# Patient Record
Sex: MALE | Race: WHITE | NOT HISPANIC OR LATINO | Employment: OTHER | URBAN - METROPOLITAN AREA
[De-identification: names, ages, dates, MRNs, and addresses within clinical notes are randomized per-mention and may not be internally consistent; named-entity substitution may affect disease eponyms.]

---

## 2023-05-25 ENCOUNTER — TELEPHONE (OUTPATIENT)
Dept: PRIMARY CARE | Facility: CLINIC | Age: 68
End: 2023-05-25
Payer: MEDICARE

## 2023-05-25 NOTE — TELEPHONE ENCOUNTER
Son called and is questioning how much clopidigril dad should be taking, 1 or 2 tabs.  Hospital discontinued prolithium (? On spelling) which dad was taking bid.  ([phone])

## 2023-05-25 NOTE — TELEPHONE ENCOUNTER
Son called and is questioning how much clopidigril dad should be taking, 1 or 2 tabs.  Hospital discontinued prolithium (? On spelling) which dad was taking bid.  ([phone])

## 2023-11-06 DIAGNOSIS — I25.10 CORONARY ARTERY DISEASE INVOLVING NATIVE CORONARY ARTERY OF NATIVE HEART WITHOUT ANGINA PECTORIS: Primary | ICD-10-CM

## 2023-11-06 RX ORDER — ATORVASTATIN CALCIUM 80 MG/1
80 TABLET, FILM COATED ORAL NIGHTLY
COMMUNITY
Start: 2023-08-18 | End: 2023-11-17 | Stop reason: SDUPTHER

## 2023-11-06 RX ORDER — CLOPIDOGREL BISULFATE 75 MG/1
75 TABLET ORAL DAILY
COMMUNITY
Start: 2023-08-09 | End: 2023-11-06 | Stop reason: SDUPTHER

## 2023-11-06 RX ORDER — CLOPIDOGREL BISULFATE 75 MG/1
75 TABLET ORAL DAILY
Qty: 90 TABLET | Refills: 0 | Status: SHIPPED | OUTPATIENT
Start: 2023-11-06 | End: 2024-02-05 | Stop reason: SDUPTHER

## 2023-11-06 RX ORDER — METOPROLOL SUCCINATE 200 MG/1
200 TABLET, EXTENDED RELEASE ORAL DAILY
COMMUNITY
Start: 2023-09-22

## 2023-11-06 RX ORDER — LOSARTAN POTASSIUM 25 MG/1
25 TABLET ORAL DAILY
COMMUNITY
Start: 2023-09-11 | End: 2024-06-03

## 2023-11-17 DIAGNOSIS — I25.10 CORONARY ARTERY DISEASE, UNSPECIFIED VESSEL OR LESION TYPE, UNSPECIFIED WHETHER ANGINA PRESENT, UNSPECIFIED WHETHER NATIVE OR TRANSPLANTED HEART: ICD-10-CM

## 2023-11-17 RX ORDER — ATORVASTATIN CALCIUM 80 MG/1
80 TABLET, FILM COATED ORAL NIGHTLY
Qty: 90 TABLET | Refills: 3 | Status: SHIPPED | OUTPATIENT
Start: 2023-11-17 | End: 2024-02-13 | Stop reason: SDUPTHER

## 2024-02-05 DIAGNOSIS — I25.10 CORONARY ARTERY DISEASE INVOLVING NATIVE CORONARY ARTERY OF NATIVE HEART WITHOUT ANGINA PECTORIS: ICD-10-CM

## 2024-02-05 RX ORDER — CLOPIDOGREL BISULFATE 75 MG/1
75 TABLET ORAL DAILY
Qty: 90 TABLET | Refills: 0 | Status: SHIPPED | OUTPATIENT
Start: 2024-02-05

## 2024-02-13 DIAGNOSIS — I25.10 CORONARY ARTERY DISEASE, UNSPECIFIED VESSEL OR LESION TYPE, UNSPECIFIED WHETHER ANGINA PRESENT, UNSPECIFIED WHETHER NATIVE OR TRANSPLANTED HEART: ICD-10-CM

## 2024-02-13 RX ORDER — ATORVASTATIN CALCIUM 80 MG/1
80 TABLET, FILM COATED ORAL NIGHTLY
Qty: 90 TABLET | Refills: 3 | Status: SHIPPED | OUTPATIENT
Start: 2024-02-13

## 2024-03-22 ENCOUNTER — APPOINTMENT (OUTPATIENT)
Dept: CARDIOLOGY | Facility: CLINIC | Age: 69
End: 2024-03-22
Payer: COMMERCIAL

## 2024-05-29 PROBLEM — M19.90 OSTEOARTHRITIS: Status: ACTIVE | Noted: 2024-05-29

## 2024-05-29 PROBLEM — I25.2 OLD MI (MYOCARDIAL INFARCTION): Status: ACTIVE | Noted: 2024-05-29

## 2024-05-29 PROBLEM — I25.10 ATHEROSCLEROTIC HEART DISEASE: Status: ACTIVE | Noted: 2024-05-29

## 2024-05-29 PROBLEM — I10 ESSENTIAL HYPERTENSION: Status: ACTIVE | Noted: 2024-05-29

## 2024-05-29 PROBLEM — M25.512 LEFT SHOULDER PAIN: Status: ACTIVE | Noted: 2024-05-29

## 2024-05-29 PROBLEM — R07.9 CHEST PAIN: Status: ACTIVE | Noted: 2024-05-29

## 2024-05-29 PROBLEM — E78.5 HYPERLIPIDEMIA: Status: ACTIVE | Noted: 2024-05-29

## 2024-06-03 DIAGNOSIS — I10 ESSENTIAL HYPERTENSION: Primary | ICD-10-CM

## 2024-06-03 RX ORDER — LOSARTAN POTASSIUM 25 MG/1
25 TABLET ORAL DAILY
Qty: 90 TABLET | Refills: 3 | Status: SHIPPED | OUTPATIENT
Start: 2024-06-03

## 2024-06-21 ENCOUNTER — APPOINTMENT (OUTPATIENT)
Dept: CARDIOLOGY | Facility: CLINIC | Age: 69
End: 2024-06-21
Payer: COMMERCIAL

## 2024-06-21 VITALS
SYSTOLIC BLOOD PRESSURE: 134 MMHG | HEIGHT: 70 IN | OXYGEN SATURATION: 97 % | WEIGHT: 145.8 LBS | DIASTOLIC BLOOD PRESSURE: 80 MMHG | HEART RATE: 78 BPM | BODY MASS INDEX: 20.87 KG/M2

## 2024-06-21 DIAGNOSIS — I25.2 OLD MI (MYOCARDIAL INFARCTION): ICD-10-CM

## 2024-06-21 DIAGNOSIS — E78.49 OTHER HYPERLIPIDEMIA: ICD-10-CM

## 2024-06-21 DIAGNOSIS — I25.10 ATHEROSCLEROSIS OF NATIVE CORONARY ARTERY OF NATIVE HEART WITHOUT ANGINA PECTORIS: ICD-10-CM

## 2024-06-21 DIAGNOSIS — I10 ESSENTIAL HYPERTENSION: ICD-10-CM

## 2024-06-21 PROCEDURE — 1159F MED LIST DOCD IN RCRD: CPT | Performed by: INTERNAL MEDICINE

## 2024-06-21 PROCEDURE — 99214 OFFICE O/P EST MOD 30 MIN: CPT | Performed by: INTERNAL MEDICINE

## 2024-06-21 PROCEDURE — 93000 ELECTROCARDIOGRAM COMPLETE: CPT | Performed by: INTERNAL MEDICINE

## 2024-06-21 PROCEDURE — 3075F SYST BP GE 130 - 139MM HG: CPT | Performed by: INTERNAL MEDICINE

## 2024-06-21 PROCEDURE — 3079F DIAST BP 80-89 MM HG: CPT | Performed by: INTERNAL MEDICINE

## 2024-06-21 RX ORDER — ALPRAZOLAM 0.5 MG/1
0.5 TABLET ORAL NIGHTLY PRN
COMMUNITY
Start: 2024-06-11 | End: 2024-07-11

## 2024-06-21 NOTE — PROGRESS NOTES
[facility] Cardiology Outpatient Follow-up Visit     Reason for Visit: CAD    HPI: Jorge Marshall is a 69 y.o.  male who presents today for followup.     Patient is a 69-year-old male with a history of hypertension, borderline hyperlipidemia, family history of CAD and a previous history of spontaneous carotid dissection who initially presented with chest discomfort. He was found to have NSTEMI. He underwent cardiac catheterization and stent implantation. He presents today for follow-up. He denies any chest discomfort per se. He does admit to palpitations and extra heartbeats. He presented in August 2023 for these issues and felt he was having another heart attack.  He had 1 episode in April and May 2024.  These lasted 5 to 10 seconds and resolved spontaneously.  He denies any shortness of breath, lightheadedness, syncope, orthopnea, PND, lower extremity edema.      6/16/2023 ECG demonstrates sinus rhythm, heart rate 100. May 2023 . 5/22/2023 echo: Ejection fraction 60 to 65%, asymmetric left ventricular hypertrophy, mild aortic regurgitation. Cardiac catheterization 5/22/2023: Severe distal to apical LAD disease suitable for medical therapy, severe proximal circumflex artery stenosis status post [place] UMER, mild RCA disease, normal left heart filling pressures. 8/25/2023 MPI: Normal perfusion, ejection fraction 56%.  8/25/2023 , LDL 40, HDL 50, triglycerides 55.  6/21/2024 ECG: Normal sinus rhythm, poor R wave progression.    Past Medical History:   He has no past medical history on file.    Surgical History:   He has no past surgical history on file.    Family History:   No family history on file.    Allergies:  Penicillins     Social History:   Remote tobacco, no alcohol or drugs.    Prior Cardiovascular Testing (Personally Reviewed):     Review of Systems:  Review of Systems   All other systems reviewed and are negative.      Outpatient Medications:    Current Outpatient Medications:      atorvastatin (Lipitor) 80 mg tablet, Take 1 tablet (80 mg) by mouth once daily at bedtime., Disp: 90 tablet, Rfl: 3    clopidogrel (Plavix) 75 mg tablet, Take 1 tablet (75 mg) by mouth once daily., Disp: 90 tablet, Rfl: 0    losartan (Cozaar) 25 mg tablet, TAKE 1 TABLET BY MOUTH EVERY DAY, Disp: 90 tablet, Rfl: 3    metoprolol succinate XL (Toprol-XL) 200 mg 24 hr tablet, Take 1 tablet (200 mg) by mouth once daily., Disp: , Rfl:      Last Recorded Vitals  There were no vitals taken for this visit.    Physical Exam:    Physical Exam  Vitals reviewed.   Constitutional:       Appearance: Normal appearance.   HENT:      Head: Normocephalic and atraumatic.      Mouth/Throat:      Mouth: Mucous membranes are moist.      Pharynx: Oropharynx is clear.   Eyes:      Extraocular Movements: Extraocular movements intact.      Conjunctiva/sclera: Conjunctivae normal.   Cardiovascular:      Rate and Rhythm: Normal rate and regular rhythm.      Pulses: Normal pulses.      Heart sounds: Normal heart sounds.   Pulmonary:      Effort: Pulmonary effort is normal.      Breath sounds: Normal breath sounds.   Abdominal:      General: Bowel sounds are normal.      Palpations: Abdomen is soft.   Musculoskeletal:         General: No swelling.      Cervical back: Neck supple.   Skin:     General: Skin is warm and dry.   Neurological:      General: No focal deficit present.      Mental Status: He is alert.   Psychiatric:         Mood and Affect: Mood normal.         Behavior: Behavior normal.         Lab/Radiology/Diagnostic Review:    Labs    Lab Results   Component Value Date    GLUCOSE 98 08/24/2023    CALCIUM 9.8 08/24/2023     08/24/2023    K 5.0 08/24/2023    CO2 29 08/24/2023     08/24/2023    BUN 15 08/24/2023    CREATININE 1.06 08/24/2023       Lab Results   Component Value Date    WBC 6.7 08/24/2023    HGB 15.5 08/24/2023    HCT 47.8 08/24/2023    MCV 91 08/24/2023     08/24/2023       Lab Results   Component Value  "Date    CHOL 101 08/25/2023    CHOL CANCELED 05/20/2023    CHOL 204 (H) 05/20/2023     Lab Results   Component Value Date    HDL 49.9 08/25/2023    HDL CANCELED 05/20/2023    HDL 48.5 05/20/2023     No results found for: \"LDLCALC\"  Lab Results   Component Value Date    TRIG 55 08/25/2023    TRIG CANCELED 05/20/2023    TRIG 138 05/20/2023     No components found for: \"CHOLHDL\"    Lab Results   Component Value Date    BNP 7 08/24/2023       Lab Results   Component Value Date    TSH 1.09 08/24/2023       Assessment:   Patient is doing reasonably well from a cardiac standpoint.     Patient will stay on high intensity statin and clopidogrel therapy.    We will check a lipid panel prior to his next visit.     Patient does admit to a rare palpitation and was diagnosed with PVCs. Continue metoprolol succinate 200 mg daily. He will stay on losartan 25 mg daily. Some of this may be related to an anxious demeanor.     He will call us if he has other issues.     Patient will follow-up with us in 12 months or sooner if he has more problems.     Irving Sadler MD      "

## 2024-06-21 NOTE — LETTER
June 21, 2024       No Recipients    Patient: Jorge Marshall   YOB: 1955   Date of Visit: 6/21/2024       Dear Dr. Tijerina Recipients:    Thank you for referring Jorge Marshall to me for evaluation. Below are my notes for this consultation.  If you have questions, please do not hesitate to call me. I look forward to following your patient along with you.       Sincerely,     Irving Sadler MD      CC:   No Recipients  ______________________________________________________________________________________        [facility] Cardiology Outpatient Follow-up Visit     Reason for Visit: CAD    HPI: Jorge Marshall is a 69 y.o.  male who presents today for followup.     Patient is a 69-year-old male with a history of hypertension, borderline hyperlipidemia, family history of CAD and a previous history of spontaneous carotid dissection who initially presented with chest discomfort. He was found to have NSTEMI. He underwent cardiac catheterization and stent implantation. He presents today for follow-up. He denies any chest discomfort per se. He does admit to palpitations and extra heartbeats. He presented in August 2023 for these issues and felt he was having another heart attack.  He had 1 episode in April and May 2024.  These lasted 5 to 10 seconds and resolved spontaneously.  He denies any shortness of breath, lightheadedness, syncope, orthopnea, PND, lower extremity edema.      6/16/2023 ECG demonstrates sinus rhythm, heart rate 100. May 2023 . 5/22/2023 echo: Ejection fraction 60 to 65%, asymmetric left ventricular hypertrophy, mild aortic regurgitation. Cardiac catheterization 5/22/2023: Severe distal to apical LAD disease suitable for medical therapy, severe proximal circumflex artery stenosis status post [place] UMER, mild RCA disease, normal left heart filling pressures. 8/25/2023 MPI: Normal perfusion, ejection fraction 56%.  8/25/2023 , LDL 40, HDL 50, triglycerides 55.   6/21/2024 ECG: Normal sinus rhythm, poor R wave progression.    Past Medical History:   He has no past medical history on file.    Surgical History:   He has no past surgical history on file.    Family History:   No family history on file.    Allergies:  Penicillins     Social History:   Remote tobacco, no alcohol or drugs.    Prior Cardiovascular Testing (Personally Reviewed):     Review of Systems:  Review of Systems   All other systems reviewed and are negative.      Outpatient Medications:    Current Outpatient Medications:   •  atorvastatin (Lipitor) 80 mg tablet, Take 1 tablet (80 mg) by mouth once daily at bedtime., Disp: 90 tablet, Rfl: 3  •  clopidogrel (Plavix) 75 mg tablet, Take 1 tablet (75 mg) by mouth once daily., Disp: 90 tablet, Rfl: 0  •  losartan (Cozaar) 25 mg tablet, TAKE 1 TABLET BY MOUTH EVERY DAY, Disp: 90 tablet, Rfl: 3  •  metoprolol succinate XL (Toprol-XL) 200 mg 24 hr tablet, Take 1 tablet (200 mg) by mouth once daily., Disp: , Rfl:      Last Recorded Vitals  There were no vitals taken for this visit.    Physical Exam:    Physical Exam  Vitals reviewed.   Constitutional:       Appearance: Normal appearance.   HENT:      Head: Normocephalic and atraumatic.      Mouth/Throat:      Mouth: Mucous membranes are moist.      Pharynx: Oropharynx is clear.   Eyes:      Extraocular Movements: Extraocular movements intact.      Conjunctiva/sclera: Conjunctivae normal.   Cardiovascular:      Rate and Rhythm: Normal rate and regular rhythm.      Pulses: Normal pulses.      Heart sounds: Normal heart sounds.   Pulmonary:      Effort: Pulmonary effort is normal.      Breath sounds: Normal breath sounds.   Abdominal:      General: Bowel sounds are normal.      Palpations: Abdomen is soft.   Musculoskeletal:         General: No swelling.      Cervical back: Neck supple.   Skin:     General: Skin is warm and dry.   Neurological:      General: No focal deficit present.      Mental Status: He is alert.  "  Psychiatric:         Mood and Affect: Mood normal.         Behavior: Behavior normal.         Lab/Radiology/Diagnostic Review:    Labs    Lab Results   Component Value Date    GLUCOSE 98 08/24/2023    CALCIUM 9.8 08/24/2023     08/24/2023    K 5.0 08/24/2023    CO2 29 08/24/2023     08/24/2023    BUN 15 08/24/2023    CREATININE 1.06 08/24/2023       Lab Results   Component Value Date    WBC 6.7 08/24/2023    HGB 15.5 08/24/2023    HCT 47.8 08/24/2023    MCV 91 08/24/2023     08/24/2023       Lab Results   Component Value Date    CHOL 101 08/25/2023    CHOL CANCELED 05/20/2023    CHOL 204 (H) 05/20/2023     Lab Results   Component Value Date    HDL 49.9 08/25/2023    HDL CANCELED 05/20/2023    HDL 48.5 05/20/2023     No results found for: \"LDLCALC\"  Lab Results   Component Value Date    TRIG 55 08/25/2023    TRIG CANCELED 05/20/2023    TRIG 138 05/20/2023     No components found for: \"CHOLHDL\"    Lab Results   Component Value Date    BNP 7 08/24/2023       Lab Results   Component Value Date    TSH 1.09 08/24/2023       Assessment:   Patient is doing reasonably well from a cardiac standpoint.     Patient will stay on high intensity statin and clopidogrel therapy.    We will check a lipid panel prior to his next visit.     Patient does admit to a rare palpitation and was diagnosed with PVCs. Continue metoprolol succinate 200 mg daily. He will stay on losartan 25 mg daily. Some of this may be related to an anxious demeanor.     He will call us if he has other issues.     Patient will follow-up with us in 12 months or sooner if he has more problems.     Irving Sadler MD    "

## 2024-06-21 NOTE — LETTER
June 21, 2024     Angel Cottrell MD  [address] [facility] [address]    Patient: Jorge Marshall   YOB: 1955   Date of Visit: 6/21/2024       Dear Dr. Angel Cottrell MD:    Thank you for referring Jorge Marshall to me for evaluation. Below are my notes for this consultation.  If you have questions, please do not hesitate to call me. I look forward to following your patient along with you.       Sincerely,     Irving Sadler MD      CC: No Recipients  ______________________________________________________________________________________        [facility] Cardiology Outpatient Follow-up Visit     Reason for Visit: CAD    HPI: Jorge Marshall is a 69 y.o.  male who presents today for followup.     Patient is a 69-year-old male with a history of hypertension, borderline hyperlipidemia, family history of CAD and a previous history of spontaneous carotid dissection who initially presented with chest discomfort. He was found to have NSTEMI. He underwent cardiac catheterization and stent implantation. He presents today for follow-up. He denies any chest discomfort per se. He does admit to palpitations and extra heartbeats. He presented in August 2023 for these issues and felt he was having another heart attack.  He had 1 episode in April and May 2024.  These lasted 5 to 10 seconds and resolved spontaneously.  He denies any shortness of breath, lightheadedness, syncope, orthopnea, PND, lower extremity edema.      6/16/2023 ECG demonstrates sinus rhythm, heart rate 100. May 2023 . 5/22/2023 echo: Ejection fraction 60 to 65%, asymmetric left ventricular hypertrophy, mild aortic regurgitation. Cardiac catheterization 5/22/2023: Severe distal to apical LAD disease suitable for medical therapy, severe proximal circumflex artery stenosis status post [place] UMER, mild RCA disease, normal left heart filling pressures. 8/25/2023 MPI: Normal perfusion, ejection fraction 56%.  8/25/2023 TC  101, LDL 40, HDL 50, triglycerides 55.  6/21/2024 ECG: Normal sinus rhythm, poor R wave progression.    Past Medical History:   He has no past medical history on file.    Surgical History:   He has no past surgical history on file.    Family History:   No family history on file.    Allergies:  Penicillins     Social History:   Remote tobacco, no alcohol or drugs.    Prior Cardiovascular Testing (Personally Reviewed):     Review of Systems:  Review of Systems   All other systems reviewed and are negative.      Outpatient Medications:    Current Outpatient Medications:   •  atorvastatin (Lipitor) 80 mg tablet, Take 1 tablet (80 mg) by mouth once daily at bedtime., Disp: 90 tablet, Rfl: 3  •  clopidogrel (Plavix) 75 mg tablet, Take 1 tablet (75 mg) by mouth once daily., Disp: 90 tablet, Rfl: 0  •  losartan (Cozaar) 25 mg tablet, TAKE 1 TABLET BY MOUTH EVERY DAY, Disp: 90 tablet, Rfl: 3  •  metoprolol succinate XL (Toprol-XL) 200 mg 24 hr tablet, Take 1 tablet (200 mg) by mouth once daily., Disp: , Rfl:      Last Recorded Vitals  There were no vitals taken for this visit.    Physical Exam:    Physical Exam  Vitals reviewed.   Constitutional:       Appearance: Normal appearance.   HENT:      Head: Normocephalic and atraumatic.      Mouth/Throat:      Mouth: Mucous membranes are moist.      Pharynx: Oropharynx is clear.   Eyes:      Extraocular Movements: Extraocular movements intact.      Conjunctiva/sclera: Conjunctivae normal.   Cardiovascular:      Rate and Rhythm: Normal rate and regular rhythm.      Pulses: Normal pulses.      Heart sounds: Normal heart sounds.   Pulmonary:      Effort: Pulmonary effort is normal.      Breath sounds: Normal breath sounds.   Abdominal:      General: Bowel sounds are normal.      Palpations: Abdomen is soft.   Musculoskeletal:         General: No swelling.      Cervical back: Neck supple.   Skin:     General: Skin is warm and dry.   Neurological:      General: No focal deficit  "present.      Mental Status: He is alert.   Psychiatric:         Mood and Affect: Mood normal.         Behavior: Behavior normal.         Lab/Radiology/Diagnostic Review:    Labs    Lab Results   Component Value Date    GLUCOSE 98 08/24/2023    CALCIUM 9.8 08/24/2023     08/24/2023    K 5.0 08/24/2023    CO2 29 08/24/2023     08/24/2023    BUN 15 08/24/2023    CREATININE 1.06 08/24/2023       Lab Results   Component Value Date    WBC 6.7 08/24/2023    HGB 15.5 08/24/2023    HCT 47.8 08/24/2023    MCV 91 08/24/2023     08/24/2023       Lab Results   Component Value Date    CHOL 101 08/25/2023    CHOL CANCELED 05/20/2023    CHOL 204 (H) 05/20/2023     Lab Results   Component Value Date    HDL 49.9 08/25/2023    HDL CANCELED 05/20/2023    HDL 48.5 05/20/2023     No results found for: \"LDLCALC\"  Lab Results   Component Value Date    TRIG 55 08/25/2023    TRIG CANCELED 05/20/2023    TRIG 138 05/20/2023     No components found for: \"CHOLHDL\"    Lab Results   Component Value Date    BNP 7 08/24/2023       Lab Results   Component Value Date    TSH 1.09 08/24/2023       Assessment:   Patient is doing reasonably well from a cardiac standpoint.     Patient will stay on high intensity statin and clopidogrel therapy.    We will check a lipid panel prior to his next visit.     Patient does admit to a rare palpitation and was diagnosed with PVCs. Continue metoprolol succinate 200 mg daily. He will stay on losartan 25 mg daily. Some of this may be related to an anxious demeanor.     He will call us if he has other issues.     Patient will follow-up with us in 12 months or sooner if he has more problems.     Irving Sadler MD      "

## 2024-07-24 DIAGNOSIS — I25.10 CORONARY ARTERY DISEASE INVOLVING NATIVE CORONARY ARTERY OF NATIVE HEART WITHOUT ANGINA PECTORIS: ICD-10-CM

## 2024-07-24 RX ORDER — CLOPIDOGREL BISULFATE 75 MG/1
75 TABLET ORAL DAILY
Qty: 90 TABLET | Refills: 3 | Status: SHIPPED | OUTPATIENT
Start: 2024-07-24

## 2024-09-14 DIAGNOSIS — I10 ESSENTIAL HYPERTENSION: Primary | ICD-10-CM

## 2024-09-16 RX ORDER — METOPROLOL SUCCINATE 200 MG/1
200 TABLET, EXTENDED RELEASE ORAL DAILY
Qty: 90 TABLET | Refills: 3 | Status: SHIPPED | OUTPATIENT
Start: 2024-09-16

## 2025-05-20 DIAGNOSIS — I25.10 CORONARY ARTERY DISEASE, UNSPECIFIED VESSEL OR LESION TYPE, UNSPECIFIED WHETHER ANGINA PRESENT, UNSPECIFIED WHETHER NATIVE OR TRANSPLANTED HEART: ICD-10-CM

## 2025-05-20 RX ORDER — ATORVASTATIN CALCIUM 80 MG/1
80 TABLET, FILM COATED ORAL NIGHTLY
Qty: 90 TABLET | Refills: 3 | Status: SHIPPED | OUTPATIENT
Start: 2025-05-20

## 2025-06-02 DIAGNOSIS — I10 ESSENTIAL HYPERTENSION: ICD-10-CM

## 2025-06-02 RX ORDER — LOSARTAN POTASSIUM 25 MG/1
25 TABLET ORAL DAILY
Qty: 90 TABLET | Refills: 0 | Status: SHIPPED | OUTPATIENT
Start: 2025-06-02 | End: 2026-06-02

## 2025-06-20 ENCOUNTER — APPOINTMENT (OUTPATIENT)
Dept: CARDIOLOGY | Facility: CLINIC | Age: 70
End: 2025-06-20
Payer: COMMERCIAL

## 2025-06-20 VITALS
BODY MASS INDEX: 20.66 KG/M2 | DIASTOLIC BLOOD PRESSURE: 80 MMHG | WEIGHT: 144 LBS | OXYGEN SATURATION: 97 % | SYSTOLIC BLOOD PRESSURE: 124 MMHG | HEART RATE: 70 BPM

## 2025-06-20 DIAGNOSIS — I10 ESSENTIAL HYPERTENSION: ICD-10-CM

## 2025-06-20 DIAGNOSIS — I25.2 HISTORY OF MYOCARDIAL INFARCTION: ICD-10-CM

## 2025-06-20 DIAGNOSIS — I25.10 ATHEROSCLEROSIS OF NATIVE CORONARY ARTERY OF NATIVE HEART WITHOUT ANGINA PECTORIS: Primary | ICD-10-CM

## 2025-06-20 DIAGNOSIS — R00.2 PALPITATIONS: ICD-10-CM

## 2025-06-20 PROBLEM — R07.9 CHEST PAIN: Status: RESOLVED | Noted: 2024-05-29 | Resolved: 2025-06-20

## 2025-06-20 PROCEDURE — 3074F SYST BP LT 130 MM HG: CPT | Performed by: STUDENT IN AN ORGANIZED HEALTH CARE EDUCATION/TRAINING PROGRAM

## 2025-06-20 PROCEDURE — 99214 OFFICE O/P EST MOD 30 MIN: CPT | Performed by: STUDENT IN AN ORGANIZED HEALTH CARE EDUCATION/TRAINING PROGRAM

## 2025-06-20 PROCEDURE — 99202 OFFICE O/P NEW SF 15 MIN: CPT

## 2025-06-20 PROCEDURE — 1159F MED LIST DOCD IN RCRD: CPT | Performed by: STUDENT IN AN ORGANIZED HEALTH CARE EDUCATION/TRAINING PROGRAM

## 2025-06-20 PROCEDURE — 1160F RVW MEDS BY RX/DR IN RCRD: CPT | Performed by: STUDENT IN AN ORGANIZED HEALTH CARE EDUCATION/TRAINING PROGRAM

## 2025-06-20 PROCEDURE — 3079F DIAST BP 80-89 MM HG: CPT | Performed by: STUDENT IN AN ORGANIZED HEALTH CARE EDUCATION/TRAINING PROGRAM

## 2025-06-20 ASSESSMENT — ENCOUNTER SYMPTOMS
PSYCHIATRIC NEGATIVE: 1
NEUROLOGICAL NEGATIVE: 1
SYNCOPE: 0
ENDOCRINE NEGATIVE: 1
CONSTITUTIONAL NEGATIVE: 1
PND: 0
EYES NEGATIVE: 1
ORTHOPNEA: 0
MUSCULOSKELETAL NEGATIVE: 1
RESPIRATORY NEGATIVE: 1
PALPITATIONS: 0
ALLERGIC/IMMUNOLOGIC NEGATIVE: 1
HEMATOLOGIC/LYMPHATIC NEGATIVE: 1
NEAR-SYNCOPE: 0
DYSPNEA ON EXERTION: 0
GASTROINTESTINAL NEGATIVE: 1

## 2025-06-20 NOTE — PROGRESS NOTES
Cardiology New Patient History and Physical    Reason for referral: CAD    HPI: Jorge Marshall is a 70 y.o.  male who presents today for CAD. Past medical history of CAD s/p UMER proximal left cx (5/2023), hypertension, dyslipidemia, osteoarthritis.      Past Medical History:   - as above    Surgical History:   He has no past surgical history on file.    Family History:   Family History   Problem Relation Name Age of Onset    Coronary artery disease Mother      Coronary artery disease Father      Parkinsonism Father      Coronary artery disease Maternal Grandfather         Allergies:  Penicillins     Social History:   - Non-smoker; no alcohol use  - Retired: worked in [employer]     Prior Cardiovascular Testing (personally reviewed):     ECG (6/21/2024)- Normal sinus rhythm, poor R wave progression     NM stress (8/25/2023)  1. Normal stress myocardial perfusion imaging in response to  pharmacologic stress.  2. Well-maintained left ventricular function.  56%    LHC (5/22/2023)  1. Severe distal to apical LAD disease suitable for medical therapy at this time.  2. Severe proximal circumflex artery disease status post successful [place] 3.5 x 12 mm UMER, postdilated with 4.0 NC.  3. Mild RCA disease.  4. Normal left heart filling pressures.    Review of Systems:  Review of Systems   Constitutional: Negative.   HENT: Negative.     Eyes: Negative.    Cardiovascular:  Negative for chest pain, dyspnea on exertion, near-syncope, orthopnea, palpitations, paroxysmal nocturnal dyspnea and syncope.   Respiratory: Negative.     Endocrine: Negative.    Hematologic/Lymphatic: Negative.    Skin: Negative.    Musculoskeletal: Negative.    Gastrointestinal: Negative.    Genitourinary: Negative.    Neurological: Negative.    Psychiatric/Behavioral: Negative.     Allergic/Immunologic: Negative.        Objective     Outpatient Medications:  Current Medications[1]     Last Recorded Vitals  /80   Pulse 70   Wt  "65.3 kg (144 lb)   SpO2 97%   BMI 20.66 kg/m²     Physical Exam:  Physical Exam    Lab Review:    Lab Results   Component Value Date    GLUCOSE 98 08/24/2023    CALCIUM 9.8 08/24/2023     08/24/2023    K 5.0 08/24/2023    CO2 29 08/24/2023     08/24/2023    BUN 15 08/24/2023    CREATININE 1.06 08/24/2023       Lab Results   Component Value Date    WBC 6.7 08/24/2023    HGB 15.5 08/24/2023    HCT 47.8 08/24/2023    MCV 91 08/24/2023     08/24/2023       Lab Results   Component Value Date    CHOL 101 08/25/2023    CHOL CANCELED 05/20/2023    CHOL 204 (H) 05/20/2023     Lab Results   Component Value Date    HDL 49.9 08/25/2023    HDL CANCELED 05/20/2023    HDL 48.5 05/20/2023     No results found for: \"LDLCALC\"  Lab Results   Component Value Date    TRIG 55 08/25/2023    TRIG CANCELED 05/20/2023    TRIG 138 05/20/2023     Lab Results   Component Value Date    BNP 7 08/24/2023       Lab Results   Component Value Date    TSH 1.09 08/24/2023       Assessment:   70 y.o.  male who presents today for CAD. Past medical history of CAD s/p UMER proximal left cx (5/2023), hypertension, dyslipidemia, osteoarthritis.    Overall Plan:      Disposition: Return to cardiology clinic in *** months    Loc Mcbride MD             [1]   Current Outpatient Medications:     atorvastatin (Lipitor) 80 mg tablet, Take 1 tablet (80 mg) by mouth once daily at bedtime., Disp: 90 tablet, Rfl: 3    clopidogrel (Plavix) 75 mg tablet, TAKE 1 TABLET BY MOUTH ONCE DAILY., Disp: 90 tablet, Rfl: 3    losartan (Cozaar) 25 mg tablet, Take 1 tablet (25 mg) by mouth once daily., Disp: 90 tablet, Rfl: 0    metoprolol succinate XL (Toprol-XL) 200 mg 24 hr tablet, TAKE 1 TABLET BY MOUTH EVERY DAY, Disp: 90 tablet, Rfl: 3    ALPRAZolam (Xanax) 0.5 mg tablet, Take 1 tablet (0.5 mg) by mouth as needed at bedtime., Disp: , Rfl:     " "Review:    Lab Results   Component Value Date    GLUCOSE 98 08/24/2023    CALCIUM 9.8 08/24/2023     08/24/2023    K 5.0 08/24/2023    CO2 29 08/24/2023     08/24/2023    BUN 15 08/24/2023    CREATININE 1.06 08/24/2023       Lab Results   Component Value Date    WBC 6.7 08/24/2023    HGB 15.5 08/24/2023    HCT 47.8 08/24/2023    MCV 91 08/24/2023     08/24/2023       Lab Results   Component Value Date    CHOL 101 08/25/2023    CHOL CANCELED 05/20/2023    CHOL 204 (H) 05/20/2023     Lab Results   Component Value Date    HDL 49.9 08/25/2023    HDL CANCELED 05/20/2023    HDL 48.5 05/20/2023     No results found for: \"LDLCALC\"  Lab Results   Component Value Date    TRIG 55 08/25/2023    TRIG CANCELED 05/20/2023    TRIG 138 05/20/2023     Lab Results   Component Value Date    BNP 7 08/24/2023       Lab Results   Component Value Date    TSH 1.09 08/24/2023       Assessment:   70 y.o.  male who presents today for CAD. Past medical history of CAD s/p UMER proximal left cx (5/2023), hypertension, dyslipidemia, osteoarthritis.    Patient is currently asymptomatic from a cardiac standpoint.  No chest pain or dyspnea.  Euvolemic on exam.  Blood pressure and dyslipidemia well-controlled.  Encouraged regular physical activity and heart healthy diet.    Overall Plan:  1.  Atherosclerotic heart disease complicated by NSTEMI in May 2023 status post drug-eluting stent to the proximal left circumflex  - Encouraged heart healthy diet and regular physical activity  - Continue atorvastatin, clopidogrel, losartan, metoprolol  - Currently asymptomatic    2.  Dyslipidemia  - LDL less than 70 mg/dL; currently at goal  - Continue atorvastatin along with dietary and lifestyle modifications    3.  Primary hypertension-well-controlled  - Goal blood pressure less than 130/90 mmHg  - Continue losartan, metoprolol XL    4.  Palpitations  - Currently asymptomatic  - Continue beta-blockade    5. Discussed \"red flag\" " symptoms that should prompt immediate medical attention; patient verbalized understanding    Disposition: Return to cardiology clinic in 12 months    Loc Mcbride MD             [1]   Current Outpatient Medications:     atorvastatin (Lipitor) 80 mg tablet, Take 1 tablet (80 mg) by mouth once daily at bedtime., Disp: 90 tablet, Rfl: 3    clopidogrel (Plavix) 75 mg tablet, TAKE 1 TABLET BY MOUTH ONCE DAILY., Disp: 90 tablet, Rfl: 3    losartan (Cozaar) 25 mg tablet, Take 1 tablet (25 mg) by mouth once daily., Disp: 90 tablet, Rfl: 0    metoprolol succinate XL (Toprol-XL) 200 mg 24 hr tablet, TAKE 1 TABLET BY MOUTH EVERY DAY, Disp: 90 tablet, Rfl: 3    ALPRAZolam (Xanax) 0.5 mg tablet, Take 1 tablet (0.5 mg) by mouth as needed at bedtime., Disp: , Rfl:

## 2025-06-20 NOTE — PATIENT INSTRUCTIONS
Thank you for your visit today. Please contact our office (via MyChart or phone) with any additional questions.     [facility] [place]    Kyleigh, RN/Clinic Nurse for:    Dr. Rae Pelaez      [address] [facility]  [place] [address]    Phone: [phone] Press Option 5 then Option 3 to speak with the Clinic Nurse (Kyleigh)    _____    To Reach:    Billing Questions -    [phone]  Scheduling / Rescheduling -  Option 1  Refills / Medication Requests -  Option 3  General Office /  -  Option 4  Results -     Option 6  Medical Records -    Option 7  Repeat Options -    Option 9

## 2025-07-01 ASSESSMENT — ENCOUNTER SYMPTOMS: SHORTNESS OF BREATH: 0

## 2025-07-29 DIAGNOSIS — I25.10 CORONARY ARTERY DISEASE INVOLVING NATIVE CORONARY ARTERY OF NATIVE HEART WITHOUT ANGINA PECTORIS: ICD-10-CM

## 2025-07-29 RX ORDER — CLOPIDOGREL BISULFATE 75 MG/1
75 TABLET ORAL DAILY
Qty: 90 TABLET | Refills: 3 | Status: SHIPPED | OUTPATIENT
Start: 2025-07-29

## 2025-08-29 DIAGNOSIS — I10 ESSENTIAL HYPERTENSION: ICD-10-CM

## 2025-08-29 RX ORDER — LOSARTAN POTASSIUM 25 MG/1
25 TABLET ORAL DAILY
Qty: 90 TABLET | Refills: 2 | Status: SHIPPED | OUTPATIENT
Start: 2025-08-29